# Patient Record
Sex: FEMALE | Race: WHITE | NOT HISPANIC OR LATINO | URBAN - METROPOLITAN AREA
[De-identification: names, ages, dates, MRNs, and addresses within clinical notes are randomized per-mention and may not be internally consistent; named-entity substitution may affect disease eponyms.]

---

## 2024-10-12 ENCOUNTER — TELEPHONE (OUTPATIENT)
Dept: OTHER | Facility: OTHER | Age: 19
End: 2024-10-12

## 2024-10-15 ENCOUNTER — OFFICE VISIT (OUTPATIENT)
Age: 19
End: 2024-10-15

## 2024-10-15 VITALS
TEMPERATURE: 98.4 F | HEIGHT: 63 IN | HEART RATE: 77 BPM | SYSTOLIC BLOOD PRESSURE: 132 MMHG | OXYGEN SATURATION: 97 % | WEIGHT: 151.6 LBS | DIASTOLIC BLOOD PRESSURE: 80 MMHG | BODY MASS INDEX: 26.86 KG/M2

## 2024-10-15 DIAGNOSIS — G89.29 CHRONIC ABDOMINAL PAIN: ICD-10-CM

## 2024-10-15 DIAGNOSIS — R11.0 CHRONIC NAUSEA: Primary | ICD-10-CM

## 2024-10-15 DIAGNOSIS — R10.9 CHRONIC ABDOMINAL PAIN: ICD-10-CM

## 2024-10-15 DIAGNOSIS — Z00.00 ROUTINE ADULT HEALTH MAINTENANCE: ICD-10-CM

## 2024-10-15 DIAGNOSIS — R68.81 EARLY SATIETY: ICD-10-CM

## 2024-10-15 DIAGNOSIS — F41.9 ANXIETY: ICD-10-CM

## 2024-10-15 DIAGNOSIS — F32.A DEPRESSION, UNSPECIFIED DEPRESSION TYPE: ICD-10-CM

## 2024-10-15 PROCEDURE — 99395 PREV VISIT EST AGE 18-39: CPT | Performed by: FAMILY MEDICINE

## 2024-10-15 PROCEDURE — 93000 ELECTROCARDIOGRAM COMPLETE: CPT | Performed by: FAMILY MEDICINE

## 2024-10-15 PROCEDURE — 99204 OFFICE O/P NEW MOD 45 MIN: CPT | Performed by: FAMILY MEDICINE

## 2024-10-15 RX ORDER — VENLAFAXINE HCL 37.5 MG
CAPSULE, EXT RELEASE 24 HR ORAL
COMMUNITY
Start: 2024-08-08

## 2024-10-15 RX ORDER — PANTOPRAZOLE SODIUM 40 MG/1
40 TABLET, DELAYED RELEASE ORAL DAILY
Qty: 60 TABLET | Refills: 1 | Status: SHIPPED | OUTPATIENT
Start: 2024-10-15

## 2024-10-15 RX ORDER — BIFIDOBACTERIUM LONGUM SUBSP. INFANTIS, AVOBENZONE, HOMOSALATE, OCTISALATE, OCTOCRYLENE, AND OXYBENZONE
KIT
COMMUNITY

## 2024-10-15 RX ORDER — ARIPIPRAZOLE 10 MG/1
TABLET ORAL
COMMUNITY
Start: 2024-05-07

## 2024-10-15 RX ORDER — PROPRANOLOL HYDROCHLORIDE 10 MG/1
TABLET ORAL
COMMUNITY
Start: 2024-05-07

## 2024-10-15 RX ORDER — ONDANSETRON 4 MG/1
4 TABLET, ORALLY DISINTEGRATING ORAL EVERY 8 HOURS PRN
Qty: 30 TABLET | Refills: 1 | Status: SHIPPED | OUTPATIENT
Start: 2024-10-15

## 2024-10-15 RX ORDER — LEVONORGESTREL AND ETHINYL ESTRADIOL 0.1-0.02MG
KIT ORAL
COMMUNITY

## 2024-10-15 RX ORDER — ETONOGESTREL 68 MG/1
IMPLANT SUBCUTANEOUS
COMMUNITY

## 2024-10-15 RX ORDER — PROMETHAZINE HYDROCHLORIDE 12.5 MG/1
TABLET ORAL
COMMUNITY

## 2024-10-17 PROBLEM — R10.9 CHRONIC ABDOMINAL PAIN: Status: ACTIVE | Noted: 2024-10-17

## 2024-10-17 PROBLEM — R68.81 EARLY SATIETY: Status: ACTIVE | Noted: 2024-10-17

## 2024-10-17 PROBLEM — G89.29 CHRONIC ABDOMINAL PAIN: Status: ACTIVE | Noted: 2024-10-17

## 2024-10-17 NOTE — PROGRESS NOTES
Assessment/Plan:    18 y/o woman with: chronic nausea, chronic abdominal pain with early satiety, with depression and anxiety along with annual well visit. Will give Zofran PRN, will begin trial of protonix and refer to GI, Discussed supportive care and return parameters.     Regarding Annual well visit. Discussed various safety and health maintenance issues including healthy diet like the Mediterranean diet, exercise, ample sleep, stress reduction, and healthy weight as tolerated. Discussed supportive care and return parameters.     No problem-specific Assessment & Plan notes found for this encounter.       Diagnoses and all orders for this visit:    Chronic nausea  -     ondansetron (ZOFRAN-ODT) 4 mg disintegrating tablet; Take 1 tablet (4 mg total) by mouth every 8 (eight) hours as needed for nausea or vomiting  -     pantoprazole (PROTONIX) 40 mg tablet; Take 1 tablet (40 mg total) by mouth daily  -     Ambulatory Referral to Gastroenterology; Future  -     POCT ECG    Chronic abdominal pain  -     pantoprazole (PROTONIX) 40 mg tablet; Take 1 tablet (40 mg total) by mouth daily  -     Ambulatory Referral to Gastroenterology; Future  -     POCT ECG    Early satiety  -     pantoprazole (PROTONIX) 40 mg tablet; Take 1 tablet (40 mg total) by mouth daily  -     Ambulatory Referral to Gastroenterology; Future  -     POCT ECG    Depression, unspecified depression type    Anxiety    Routine adult health maintenance    Other orders  -     ARIPiprazole (ABILIFY) 10 mg tablet  -     Bacillus Coagulans-Inulin (Align Prebiotic-Probiotic) 5-1.25 MG-GM CHEW; Chew  -     Cholecalciferol (VITAMIN D3) 1,000 units tablet; Take 1,000 Units by mouth daily  -     etonogestrel (Nexplanon) subdermal implant; as directed Subcutaneous evry 3 yrs (Patient not taking: Reported on 10/15/2024)  -     Aviane 0.1-20 MG-MCG per tablet; Take by mouth  -     promethazine (PHENERGAN) 12.5 MG tablet; Every 6 hours  -     propranolol (INDERAL) 10  "mg tablet  -     Effexor XR 37.5 MG 24 hr capsule; Take by mouth          Subjective:     Chief Complaint   Patient presents with    New Patient Visit     Establish care    GI Problem     Patient was in urgent care with food poisoning a few days ago and was advised to follow up with a family Dr. No further concerns, ng        Patient ID: Caro Andrews is a 19 y.o. female.    Patient is a 20 y/o woman who presents to establish care at this practice. Pt has chronic nausea, chronic abdominal pain with early satiety, with depression and anxiety. No fevers chills, tolerating PO intake. Pt also here for annual well visit admits being active, eats and sleeps well.    GI Problem  The primary symptoms include abdominal pain and nausea.       The following portions of the patient's history were reviewed and updated as appropriate: allergies, current medications, past family history, past medical history, past social history, past surgical history and problem list.    Review of Systems   Constitutional: Negative.    HENT: Negative.     Eyes: Negative.    Respiratory: Negative.     Cardiovascular: Negative.    Gastrointestinal:  Positive for abdominal pain and nausea.   Endocrine: Negative.    Genitourinary: Negative.    Musculoskeletal: Negative.    Allergic/Immunologic: Negative.    Neurological: Negative.    Hematological: Negative.    Psychiatric/Behavioral: Negative.     All other systems reviewed and are negative.        Objective:      /80 (BP Location: Right arm, Patient Position: Sitting, Cuff Size: Standard)   Pulse 77   Temp 98.4 °F (36.9 °C) (Temporal)   Ht 5' 2.75\" (1.594 m)   Wt 68.8 kg (151 lb 9.6 oz)   SpO2 97%   BMI 27.07 kg/m²          Physical Exam  Constitutional:       Appearance: She is well-developed.   HENT:      Head: Atraumatic.      Right Ear: External ear normal.      Left Ear: External ear normal.   Eyes:      Conjunctiva/sclera: Conjunctivae normal.      Pupils: Pupils are equal, " round, and reactive to light.   Cardiovascular:      Rate and Rhythm: Normal rate and regular rhythm.      Heart sounds: Normal heart sounds.   Pulmonary:      Effort: Pulmonary effort is normal. No respiratory distress.      Breath sounds: Normal breath sounds.   Abdominal:      General: There is no distension.      Palpations: Abdomen is soft.      Tenderness: There is no abdominal tenderness. There is no guarding or rebound.   Musculoskeletal:         General: Normal range of motion.      Cervical back: Normal range of motion.   Skin:     General: Skin is warm and dry.   Neurological:      Mental Status: She is alert and oriented to person, place, and time.      Cranial Nerves: No cranial nerve deficit.   Psychiatric:         Behavior: Behavior normal.         Thought Content: Thought content normal.         Judgment: Judgment normal.

## 2024-11-14 PROBLEM — Z00.00 ROUTINE ADULT HEALTH MAINTENANCE: Status: RESOLVED | Noted: 2024-10-15 | Resolved: 2024-11-14

## 2024-11-26 ENCOUNTER — OFFICE VISIT (OUTPATIENT)
Dept: GASTROENTEROLOGY | Facility: MEDICAL CENTER | Age: 19
End: 2024-11-26
Payer: OTHER GOVERNMENT

## 2024-11-26 ENCOUNTER — TELEPHONE (OUTPATIENT)
Dept: GASTROENTEROLOGY | Facility: MEDICAL CENTER | Age: 19
End: 2024-11-26

## 2024-11-26 VITALS
BODY MASS INDEX: 27.93 KG/M2 | WEIGHT: 156.4 LBS | SYSTOLIC BLOOD PRESSURE: 127 MMHG | TEMPERATURE: 96.5 F | HEART RATE: 76 BPM | DIASTOLIC BLOOD PRESSURE: 80 MMHG

## 2024-11-26 DIAGNOSIS — R68.81 EARLY SATIETY: ICD-10-CM

## 2024-11-26 DIAGNOSIS — G89.29 CHRONIC ABDOMINAL PAIN: ICD-10-CM

## 2024-11-26 DIAGNOSIS — R11.0 CHRONIC NAUSEA: ICD-10-CM

## 2024-11-26 DIAGNOSIS — R10.9 CHRONIC ABDOMINAL PAIN: ICD-10-CM

## 2024-11-26 PROCEDURE — 99204 OFFICE O/P NEW MOD 45 MIN: CPT | Performed by: NURSE PRACTITIONER

## 2024-11-26 RX ORDER — BUSPIRONE HYDROCHLORIDE 5 MG/1
5 TABLET ORAL 3 TIMES DAILY
COMMUNITY

## 2024-11-26 RX ORDER — PANTOPRAZOLE SODIUM 40 MG/1
40 TABLET, DELAYED RELEASE ORAL 2 TIMES DAILY
Qty: 60 TABLET | Refills: 3 | Status: SHIPPED | OUTPATIENT
Start: 2024-11-26 | End: 2024-12-02

## 2024-11-26 RX ORDER — SODIUM CHLORIDE, SODIUM LACTATE, POTASSIUM CHLORIDE, CALCIUM CHLORIDE 600; 310; 30; 20 MG/100ML; MG/100ML; MG/100ML; MG/100ML
125 INJECTION, SOLUTION INTRAVENOUS CONTINUOUS
Status: CANCELLED | OUTPATIENT
Start: 2024-11-26

## 2024-11-26 NOTE — ASSESSMENT & PLAN NOTE
Refer above  Orders:    Ambulatory Referral to Gastroenterology    EGD; Future    pantoprazole (PROTONIX) 40 mg tablet; Take 1 tablet (40 mg total) by mouth 2 (two) times a day

## 2024-11-26 NOTE — PROGRESS NOTES
Name: Caro Andrews      : 2005      MRN: [id number]  Encounter Provider: BLESSING Reid  Encounter Date: 2024   Encounter department: [facility]  :  Assessment & Plan  Chronic nausea    Orders:    Ambulatory Referral to Gastroenterology    Chronic abdominal pain    Orders:    Ambulatory Referral to Gastroenterology    Early satiety    Orders:    Ambulatory Referral to Gastroenterology        History of Present Illness {?Quick Links Encounters * My Last Note * Last Note in Specialty * Snapshot * Since Last Visit * History :54665}    HPI  Caro Andrews is a 19 y.o. female who presents ***  {History obtained from(Optional):03969}    Review of Systems  Past Medical History   Past Medical History:   Diagnosis Date    Anxiety     Depression      Past Surgical History:   Procedure Laterality Date    APPENDECTOMY  2019    STOMACH SURGERY  2012    STOMACH SURGERY  2018     Family History   Problem Relation Age of Onset    Rheum arthritis Mother       reports that she has been smoking cigarettes. She has never used smokeless tobacco. She reports current alcohol use. She reports that she does not use drugs.  Current Outpatient Medications on File Prior to Visit   Medication Sig Dispense Refill    ARIPiprazole (ABILIFY) 10 mg tablet       Aviane 0.1-20 MG-MCG per tablet Take by mouth      Bacillus Coagulans-Inulin (Align Prebiotic-Probiotic) 5-1.25 MG-GM CHEW Chew      Cholecalciferol (VITAMIN D3) 1,000 units tablet Take 1,000 Units by mouth daily      Effexor XR 37.5 MG 24 hr capsule Take by mouth      etonogestrel (Nexplanon) subdermal implant as directed Subcutaneous evry 3 yrs (Patient not taking: Reported on 10/15/2024)      ondansetron (ZOFRAN-ODT) 4 mg disintegrating tablet Take 1 tablet (4 mg total) by mouth every 8 (eight) hours as needed for nausea or vomiting 30 tablet 1    pantoprazole (PROTONIX) 40 mg tablet Take 1 tablet (40 mg total)  by mouth daily 60 tablet 1    promethazine (PHENERGAN) 12.5 MG tablet Every 6 hours      propranolol (INDERAL) 10 mg tablet        No current facility-administered medications on file prior to visit.     Allergies   Allergen Reactions    Kiwi Extract - Food Allergy Anaphylaxis, Angioedema, Other (See Comments) and Tongue Swelling     Other reaction(s): Other (See Comments)    Acetaminophen GI Bleeding     Other reaction(s): GI bleeding    Acetaminophen-Codeine Other (See Comments)     nausea and feeling poorly    Chlorhexidine Other (See Comments)     Other reaction(s): Swelling    Strawberry Extract - Food Allergy Other (See Comments)     Other reaction(s): Swelling/Tingling    Other reaction(s): Swelling/Tingling   Other reaction(s): Swelling/Tingling      {Select to insert medical history sections (Optional):80425}     Objective {?Quick Links Trend Vitals * Enter New Vitals * Results Review * Timeline (Adult) * Labs * Imaging * Cardiology * Procedures * Lung Cancer Screening * Surgical eConsent :15609}  There were no vitals taken for this visit.     Physical Exam    {Administrative / Billing Section (Optional):86971}

## 2024-11-26 NOTE — ASSESSMENT & PLAN NOTE
Started approximately 3 years ago with chronic intermittent nausea.  She is okay when she awakens in the morning but shortly after that the nausea is persistent throughout the day.  Only occasional vomiting.  Usually vomits right after she eats.  Had a gastric emptying study in the past which was normal and a HIDA scan with CCK which noted an ejection fraction of 44%.  She did have an EGD several years ago which she is not sure of the results.  No weight loss.  Rare caffeine use.  Rare NSAID use and no alcohol use.  No weight loss.  Will rule out celiac, H. pylori, PUD, gastritis/esophagitis.  Will repeat HIDA scan with CCK as ejection fraction was 44% several years ago.  She is not sure if there is gallbladder disease in the family.  No marijuana use.    Orders:    Ambulatory Referral to Gastroenterology    EGD; Future    Tissue Transglutaminase, IgA; Future    IgA; Future    NM hepatobiliary w rx; Future    pantoprazole (PROTONIX) 40 mg tablet; Take 1 tablet (40 mg total) by mouth 2 (two) times a day    CBC (Includes Diff/Plt) (Refl); Future    Comprehensive metabolic panel; Future    Lipase; Future    TSH, 3rd generation; Future  -Antireflux diet

## 2024-11-26 NOTE — ASSESSMENT & PLAN NOTE
Refer above  Orders:    Ambulatory Referral to Gastroenterology    EGD; Future    Tissue Transglutaminase, IgA; Future    IgA; Future    NM hepatobiliary w rx; Future    pantoprazole (PROTONIX) 40 mg tablet; Take 1 tablet (40 mg total) by mouth 2 (two) times a day    CBC (Includes Diff/Plt) (Refl); Future    Comprehensive metabolic panel; Future    Lipase; Future    TSH, 3rd generation; Future

## 2024-11-26 NOTE — PROGRESS NOTES
Name: Caro Andrews      : 2005      MRN: [id number]  Encounter Provider: BLESSING Reid  Encounter Date: 2024   Encounter department: [facility]  :  Assessment & Plan  Chronic nausea  Started approximately 3 years ago with chronic intermittent nausea.  She is okay when she awakens in the morning but shortly after that the nausea is persistent throughout the day.  Only occasional vomiting.  Usually vomits right after she eats.  Had a gastric emptying study in the past which was normal and a HIDA scan with CCK which noted an ejection fraction of 44%.  She did have an EGD several years ago which she is not sure of the results.  No weight loss.  Rare caffeine use.  Rare NSAID use and no alcohol use.  No weight loss.  Will rule out celiac, H. pylori, PUD, gastritis/esophagitis.  Will repeat HIDA scan with CCK as ejection fraction was 44% several years ago.  She is not sure if there is gallbladder disease in the family.  No marijuana use.    Orders:    Ambulatory Referral to Gastroenterology    EGD; Future    Tissue Transglutaminase, IgA; Future    IgA; Future    NM hepatobiliary w rx; Future    pantoprazole (PROTONIX) 40 mg tablet; Take 1 tablet (40 mg total) by mouth 2 (two) times a day    CBC (Includes Diff/Plt) (Refl); Future    Comprehensive metabolic panel; Future    Lipase; Future    TSH, 3rd generation; Future  -Antireflux diet  Chronic abdominal pain  Refer above  Orders:    Ambulatory Referral to Gastroenterology    EGD; Future    Tissue Transglutaminase, IgA; Future    IgA; Future    NM hepatobiliary w rx; Future    pantoprazole (PROTONIX) 40 mg tablet; Take 1 tablet (40 mg total) by mouth 2 (two) times a day    CBC (Includes Diff/Plt) (Refl); Future    Comprehensive metabolic panel; Future    Lipase; Future    TSH, 3rd generation; Future    Early satiety  Refer above  Orders:    Ambulatory Referral to Gastroenterology    EGD; Future     pantoprazole (PROTONIX) 40 mg tablet; Take 1 tablet (40 mg total) by mouth 2 (two) times a day    I reviewed with patient/family potential risks of endoscopic evaluation including possible infection, bleeding or perforation.  Patient/family verbalized understanding of potential risks and agreed to procedure(s).    History of Present Illness     HPI  Caro Andrews is a 19 y.o. female who presents with chronic nausea and abdominal pain.  She has past medical history of depression and anxiety, ovarian torsion with partial removal of ovary endometriosis.  She has had 3 abdominal surgeries for endometriosis and her ovarian torsion.  Also had her appendix out.    Started approximately 3 years ago with chronic intermittent nausea.  Reports she is okay when she wakes up in the morning but within 5 minutes she develops nausea.  Only occasional vomiting if she overeats.  Cannot pinpoint triggers of nausea.  Has been on PPIs in the past with no help.  She did have a gastric emptying study in 2021 which was normal.  She did have a HIDA scan with CCK in 12/20 which noted an ejection fraction of 44%.  Ultrasound of the abdomen 6/23 was unremarkable.  No weight loss.  She does get some early satiety.  Ondansetron does help her nausea.  Also reporting intermittent abdominal cramping that can be better with a BM.  BMs can be 1-2 times per day.  Occasionally it is soft.  No melena or hematochezia.  Reports she had an EGD many years ago but not sure of the results.  She drinks caffeine twice a week.  Occasional NSAID use.  No alcohol use.  No recent labs to review.  No new meds or change of medication since this all started.  Her surgeries for her abdomen were 2015 2016 and 2018.        Ultrasound the abdomen 6/23-unremarkable.  HIDA scan with CCK 12/20-normal exam with ejection fraction of 44%.  Gastric emptying study 1/21 normal    No recent labs to review.    Previous EGD/colonoscopy        History obtained from:  patient    Review of Systems   Gastrointestinal:  Positive for abdominal pain, nausea and vomiting.   All other systems reviewed and are negative.    Past Medical History   Past Medical History:   Diagnosis Date    Anxiety     Depression      Past Surgical History:   Procedure Laterality Date    APPENDECTOMY  2019    STOMACH SURGERY  2012    STOMACH SURGERY  2018     Family History   Problem Relation Age of Onset    Rheum arthritis Mother       reports that she has been smoking cigarettes. She has never used smokeless tobacco. She reports current alcohol use. She reports that she does not use drugs.  Current Outpatient Medications on File Prior to Visit   Medication Sig Dispense Refill    ARIPiprazole (ABILIFY) 10 mg tablet       Aviane 0.1-20 MG-MCG per tablet Take by mouth      Bacillus Coagulans-Inulin (Align Prebiotic-Probiotic) 5-1.25 MG-GM CHEW Chew      Cholecalciferol (VITAMIN D3) 1,000 units tablet Take 1,000 Units by mouth daily      Effexor XR 37.5 MG 24 hr capsule Take by mouth      etonogestrel (Nexplanon) subdermal implant as directed Subcutaneous evry 3 yrs (Patient not taking: Reported on 10/15/2024)      ondansetron (ZOFRAN-ODT) 4 mg disintegrating tablet Take 1 tablet (4 mg total) by mouth every 8 (eight) hours as needed for nausea or vomiting 30 tablet 1    pantoprazole (PROTONIX) 40 mg tablet Take 1 tablet (40 mg total) by mouth daily 60 tablet 1    promethazine (PHENERGAN) 12.5 MG tablet Every 6 hours      propranolol (INDERAL) 10 mg tablet        No current facility-administered medications on file prior to visit.     Allergies   Allergen Reactions    Kiwi Extract - Food Allergy Anaphylaxis, Angioedema, Other (See Comments) and Tongue Swelling     Other reaction(s): Other (See Comments)    Acetaminophen GI Bleeding     Other reaction(s): GI bleeding    Acetaminophen-Codeine Other (See Comments)     nausea and feeling poorly    Chlorhexidine Other (See Comments)     Other reaction(s): Swelling     Strawberry Extract - Food Allergy Other (See Comments)     Other reaction(s): Swelling/Tingling    Other reaction(s): Swelling/Tingling   Other reaction(s): Swelling/Tingling      Medical History Reviewed by provider this encounter:  Tobacco  Allergies  Meds  Problems  Med Hx  Surg Hx  Fam Hx     .  Current Outpatient Medications on File Prior to Visit   Medication Sig Dispense Refill    ARIPiprazole (ABILIFY) 10 mg tablet       Aviane 0.1-20 MG-MCG per tablet Take by mouth      Bacillus Coagulans-Inulin (Align Prebiotic-Probiotic) 5-1.25 MG-GM CHEW Chew      Cholecalciferol (VITAMIN D3) 1,000 units tablet Take 1,000 Units by mouth daily      Effexor XR 37.5 MG 24 hr capsule Take by mouth      etonogestrel (Nexplanon) subdermal implant as directed Subcutaneous evry 3 yrs (Patient not taking: Reported on 10/15/2024)      ondansetron (ZOFRAN-ODT) 4 mg disintegrating tablet Take 1 tablet (4 mg total) by mouth every 8 (eight) hours as needed for nausea or vomiting 30 tablet 1    pantoprazole (PROTONIX) 40 mg tablet Take 1 tablet (40 mg total) by mouth daily 60 tablet 1    promethazine (PHENERGAN) 12.5 MG tablet Every 6 hours      propranolol (INDERAL) 10 mg tablet        No current facility-administered medications on file prior to visit.      Social History     Tobacco Use    Smoking status: Every Day     Types: Cigarettes    Smokeless tobacco: Never   Vaping Use    Vaping status: Never Used   Substance and Sexual Activity    Alcohol use: Yes     Comment: occasional    Drug use: Never    Sexual activity: Yes        Objective   There were no vitals taken for this visit.     Physical Exam  Constitutional:       Appearance: She is well-developed.   Cardiovascular:      Rate and Rhythm: Normal rate and regular rhythm.   Pulmonary:      Effort: Pulmonary effort is normal.      Breath sounds: Normal breath sounds.   Abdominal:      General: Bowel sounds are normal.      Palpations: Abdomen is soft.      Tenderness:  There is abdominal tenderness in the epigastric area.   Neurological:      Mental Status: She is alert and oriented to person, place, and time.

## 2024-11-26 NOTE — TELEPHONE ENCOUNTER
Scheduled date of EGD (as of today) December 2  Physician performing: Dr. Chisholm  Location of procedure:  [facility]  Instructions given to patient: EGD  Clearances: None  Diabetic: No

## 2024-12-02 ENCOUNTER — ANESTHESIA EVENT (OUTPATIENT)
Dept: GASTROENTEROLOGY | Facility: MEDICAL CENTER | Age: 19
End: 2024-12-02
Payer: OTHER GOVERNMENT

## 2024-12-02 ENCOUNTER — HOSPITAL ENCOUNTER (OUTPATIENT)
Dept: GASTROENTEROLOGY | Facility: MEDICAL CENTER | Age: 19
Setting detail: OUTPATIENT SURGERY
Discharge: HOME | End: 2024-12-02
Payer: OTHER GOVERNMENT

## 2024-12-02 VITALS
OXYGEN SATURATION: 100 % | HEIGHT: 63 IN | HEART RATE: 78 BPM | TEMPERATURE: 97.4 F | WEIGHT: 150 LBS | DIASTOLIC BLOOD PRESSURE: 67 MMHG | BODY MASS INDEX: 26.58 KG/M2 | RESPIRATION RATE: 16 BRPM | SYSTOLIC BLOOD PRESSURE: 124 MMHG

## 2024-12-02 DIAGNOSIS — R10.9 CHRONIC ABDOMINAL PAIN: ICD-10-CM

## 2024-12-02 DIAGNOSIS — R11.0 CHRONIC NAUSEA: ICD-10-CM

## 2024-12-02 DIAGNOSIS — R68.81 EARLY SATIETY: ICD-10-CM

## 2024-12-02 DIAGNOSIS — G89.29 CHRONIC ABDOMINAL PAIN: ICD-10-CM

## 2024-12-02 PROCEDURE — 88305 TISSUE EXAM BY PATHOLOGIST: CPT | Performed by: PATHOLOGY

## 2024-12-02 PROCEDURE — 43239 EGD BIOPSY SINGLE/MULTIPLE: CPT | Performed by: INTERNAL MEDICINE

## 2024-12-02 RX ORDER — PANTOPRAZOLE SODIUM 40 MG/1
40 TABLET, DELAYED RELEASE ORAL DAILY
Qty: 30 TABLET | Refills: 5 | Status: SHIPPED | OUTPATIENT
Start: 2024-12-02 | End: 2025-05-31

## 2024-12-02 RX ORDER — PROPOFOL 10 MG/ML
INJECTION, EMULSION INTRAVENOUS AS NEEDED
Status: DISCONTINUED | OUTPATIENT
Start: 2024-12-02 | End: 2024-12-02

## 2024-12-02 RX ORDER — FENTANYL CITRATE 50 UG/ML
INJECTION, SOLUTION INTRAMUSCULAR; INTRAVENOUS AS NEEDED
Status: DISCONTINUED | OUTPATIENT
Start: 2024-12-02 | End: 2024-12-02

## 2024-12-02 RX ORDER — SODIUM CHLORIDE 9 MG/ML
INJECTION, SOLUTION INTRAVENOUS CONTINUOUS PRN
Status: DISCONTINUED | OUTPATIENT
Start: 2024-12-02 | End: 2024-12-02

## 2024-12-02 RX ORDER — OMEPRAZOLE 40 MG/1
40 CAPSULE, DELAYED RELEASE ORAL DAILY
Qty: 30 CAPSULE | Refills: 5 | Status: SHIPPED | OUTPATIENT
Start: 2024-12-02 | End: 2024-12-02 | Stop reason: HOSPADM

## 2024-12-02 RX ORDER — LIDOCAINE HYDROCHLORIDE 20 MG/ML
INJECTION, SOLUTION EPIDURAL; INFILTRATION; INTRACAUDAL; PERINEURAL AS NEEDED
Status: DISCONTINUED | OUTPATIENT
Start: 2024-12-02 | End: 2024-12-02

## 2024-12-02 RX ORDER — SODIUM CHLORIDE 9 MG/ML
125 INJECTION, SOLUTION INTRAVENOUS CONTINUOUS
Status: CANCELLED | OUTPATIENT
Start: 2024-12-02

## 2024-12-02 RX ORDER — ONDANSETRON 2 MG/ML
4 INJECTION INTRAMUSCULAR; INTRAVENOUS ONCE AS NEEDED
Status: DISCONTINUED | OUTPATIENT
Start: 2024-12-02 | End: 2024-12-06 | Stop reason: HOSPADM

## 2024-12-02 RX ADMIN — LIDOCAINE HYDROCHLORIDE 100 MG: 20 INJECTION, SOLUTION EPIDURAL; INFILTRATION; INTRACAUDAL at 08:54

## 2024-12-02 RX ADMIN — PROPOFOL 50 MG: 10 INJECTION, EMULSION INTRAVENOUS at 08:56

## 2024-12-02 RX ADMIN — PROPOFOL 150 MG: 10 INJECTION, EMULSION INTRAVENOUS at 08:54

## 2024-12-02 RX ADMIN — PROPOFOL 50 MG: 10 INJECTION, EMULSION INTRAVENOUS at 08:58

## 2024-12-02 RX ADMIN — FENTANYL CITRATE 50 MCG: 50 INJECTION INTRAMUSCULAR; INTRAVENOUS at 08:53

## 2024-12-02 RX ADMIN — PROPOFOL 50 MG: 10 INJECTION, EMULSION INTRAVENOUS at 09:00

## 2024-12-02 RX ADMIN — SODIUM CHLORIDE: 0.9 INJECTION, SOLUTION INTRAVENOUS at 08:54

## 2024-12-02 NOTE — ANESTHESIA POSTPROCEDURE EVALUATION
Post-Op Assessment Note    CV Status:  Stable  Pain Score: 0    Pain management: adequate       Mental Status:  Alert and awake   Hydration Status:  Euvolemic   PONV Controlled:  Controlled   Airway Patency:  Patent     Post Op Vitals Reviewed: Yes    No anethesia notable event occurred.    Staff: CRNA           Last Filed PACU Vitals:  Vitals Value Taken Time   Temp     Pulse 66 12/02/24 0907   BP 94/44 12/02/24 0907   Resp 16 12/02/24 0907   SpO2 97 % 12/02/24 0907       Modified Julio:  Activity: 2 (12/2/2024  8:25 AM)  Respiration: 2 (12/2/2024  8:25 AM)  Circulation: 2 (12/2/2024  8:25 AM)  Consciousness: 2 (12/2/2024  8:25 AM)  Oxygen Saturation: 2 (12/2/2024  8:25 AM)  Modified Julio Score: 10 (12/2/2024  8:25 AM)

## 2024-12-02 NOTE — H&P
"History and Physical -  [facility]  Caro Andrews 19 y.o. female MRN: [id number]                  HPI: Caro Andrews is a 19 y.o. year old female who presents for EGD to investigate nausea, abdominal pain and early satiety.      REVIEW OF SYSTEMS: Per the HPI, and otherwise unremarkable.    Historical Information   Past Medical History:   Diagnosis Date    Anxiety     Chronic nausea     Depression      Past Surgical History:   Procedure Laterality Date    APPENDECTOMY  2019    STOMACH SURGERY  2012    STOMACH SURGERY  2018    ovarian torsion     Social History   Social History     Substance and Sexual Activity   Alcohol Use Yes    Comment: occasional     Social History     Substance and Sexual Activity   Drug Use Never    Comment: medical marijuana     Social History     Tobacco Use   Smoking Status Never   Smokeless Tobacco Never     Family History   Problem Relation Age of Onset    Rheum arthritis Mother        Meds/Allergies     Not in a hospital admission.    Allergies   Allergen Reactions    Kiwi Extract - Food Allergy Anaphylaxis, Angioedema, Other (See Comments) and Tongue Swelling     Other reaction(s): Other (See Comments)    Acetaminophen GI Bleeding     Other reaction(s): GI bleeding    Acetaminophen-Codeine Other (See Comments)     nausea and feeling poorly    Chlorhexidine Other (See Comments)     Other reaction(s): Swelling    Strawberry Extract - Food Allergy Other (See Comments)     Other reaction(s): Swelling/Tingling    Other reaction(s): Swelling/Tingling   Other reaction(s): Swelling/Tingling       Objective     Blood pressure 133/81, pulse 79, temperature (!) 97.4 °F (36.3 °C), temperature source Temporal, resp. rate 20, height 5' 3\" (1.6 m), weight 68 kg (150 lb), SpO2 96%.      PHYSICAL EXAM    Gen: NAD  CV: RRR  CHEST: Clear  ABD: soft, NT/ND  EXT: no edema      ASSESSMENT/PLAN:  Caro Andrews is a 19 y.o. year old female who presents for EGD to investigate " nausea, abdominal pain and early satiety. The patient is stable and optimized for the procedure, we reviewed risk and benefits. Risk include but not limited to infection, bleeding, perforation and missing a lesion.

## 2024-12-02 NOTE — ANESTHESIA PREPROCEDURE EVALUATION
Procedure:  EGD    Relevant Problems   NEURO/PSYCH   (+) Anxiety   (+) Chronic abdominal pain   (+) Depression        Physical Exam    Airway    Mallampati score: II  TM Distance: >3 FB  Neck ROM: full     Dental   No notable dental hx     Cardiovascular  Rhythm: regular, Rate: normal    Pulmonary   No stridor    Other Findings  post-pubertal.      Anesthesia Plan  ASA Score- 2     Anesthesia Type- IV sedation with anesthesia with ASA Monitors.         Additional Monitors:     Airway Plan:            Plan Factors-    Chart reviewed.   Existing labs reviewed. Patient summary reviewed.                  Induction- intravenous.    Postoperative Plan- Plan for postoperative opioid use.     Perioperative Resuscitation Plan - Level 1 - Full Code.       Informed Consent- Anesthetic plan and risks discussed with patient.  I personally reviewed this patient with the CRNA. Discussed and agreed on the Anesthesia Plan with the CRNA..

## 2024-12-04 ENCOUNTER — HOSPITAL ENCOUNTER (OUTPATIENT)
Dept: NUCLEAR MEDICINE | Facility: HOSPITAL | Age: 19
Discharge: HOME | End: 2024-12-04
Payer: OTHER GOVERNMENT

## 2024-12-04 ENCOUNTER — RESULTS FOLLOW-UP (OUTPATIENT)
Dept: GASTROENTEROLOGY | Facility: CLINIC | Age: 19
End: 2024-12-04

## 2024-12-04 ENCOUNTER — RESULTS FOLLOW-UP (OUTPATIENT)
Dept: GASTROENTEROLOGY | Facility: MEDICAL CENTER | Age: 19
End: 2024-12-04

## 2024-12-04 ENCOUNTER — APPOINTMENT (OUTPATIENT)
Dept: LAB | Facility: MEDICAL CENTER | Age: 19
End: 2024-12-04
Payer: OTHER GOVERNMENT

## 2024-12-04 DIAGNOSIS — G89.29 CHRONIC ABDOMINAL PAIN: ICD-10-CM

## 2024-12-04 DIAGNOSIS — R11.0 CHRONIC NAUSEA: ICD-10-CM

## 2024-12-04 DIAGNOSIS — R11.0 CHRONIC NAUSEA: Primary | ICD-10-CM

## 2024-12-04 DIAGNOSIS — R10.9 CHRONIC ABDOMINAL PAIN: ICD-10-CM

## 2024-12-04 LAB
ALBUMIN SERPL BCG-MCNC: 4.3 G/DL (ref 3.5–5)
ALP SERPL-CCNC: 20 U/L (ref 34–104)
ALT SERPL W P-5'-P-CCNC: 22 U/L (ref 7–52)
ANION GAP SERPL CALCULATED.3IONS-SCNC: 10 MMOL/L (ref 4–13)
AST SERPL W P-5'-P-CCNC: 28 U/L (ref 13–39)
BASOPHILS # BLD AUTO: 0.04 THOUSANDS/ÂΜL (ref 0–0.1)
BASOPHILS NFR BLD AUTO: 1 % (ref 0–1)
BILIRUB SERPL-MCNC: 0.54 MG/DL (ref 0.2–1)
BUN SERPL-MCNC: 18 MG/DL (ref 5–25)
CALCIUM SERPL-MCNC: 9.5 MG/DL (ref 8.4–10.2)
CHLORIDE SERPL-SCNC: 103 MMOL/L (ref 96–108)
CO2 SERPL-SCNC: 25 MMOL/L (ref 21–32)
CREAT SERPL-MCNC: 0.9 MG/DL (ref 0.6–1.3)
EOSINOPHIL # BLD AUTO: 0.09 THOUSAND/ÂΜL (ref 0–0.61)
EOSINOPHIL NFR BLD AUTO: 1 % (ref 0–6)
ERYTHROCYTE [DISTWIDTH] IN BLOOD BY AUTOMATED COUNT: 12.7 % (ref 11.6–15.1)
GFR SERPL CREATININE-BSD FRML MDRD: 91 ML/MIN/1.73SQ M
GLUCOSE P FAST SERPL-MCNC: 98 MG/DL (ref 65–99)
HCT VFR BLD AUTO: 43.1 % (ref 34.8–46.1)
HGB BLD-MCNC: 14.3 G/DL (ref 11.5–15.4)
IGA SERPL-MCNC: 100 MG/DL (ref 66–433)
IMM GRANULOCYTES # BLD AUTO: 0.04 THOUSAND/UL (ref 0–0.2)
IMM GRANULOCYTES NFR BLD AUTO: 1 % (ref 0–2)
LIPASE SERPL-CCNC: 32 U/L (ref 11–82)
LYMPHOCYTES # BLD AUTO: 1.63 THOUSANDS/ÂΜL (ref 0.6–4.47)
LYMPHOCYTES NFR BLD AUTO: 19 % (ref 14–44)
MCH RBC QN AUTO: 29.9 PG (ref 26.8–34.3)
MCHC RBC AUTO-ENTMCNC: 33.2 G/DL (ref 31.4–37.4)
MCV RBC AUTO: 90 FL (ref 82–98)
MONOCYTES # BLD AUTO: 0.54 THOUSAND/ÂΜL (ref 0.17–1.22)
MONOCYTES NFR BLD AUTO: 6 % (ref 4–12)
NEUTROPHILS # BLD AUTO: 6.46 THOUSANDS/ÂΜL (ref 1.85–7.62)
NEUTS SEG NFR BLD AUTO: 72 % (ref 43–75)
NRBC BLD AUTO-RTO: 0 /100 WBCS
PLATELET # BLD AUTO: 272 THOUSANDS/UL (ref 149–390)
PMV BLD AUTO: 10 FL (ref 8.9–12.7)
POTASSIUM SERPL-SCNC: 4.3 MMOL/L (ref 3.5–5.3)
PROT SERPL-MCNC: 7.5 G/DL (ref 6.4–8.4)
RBC # BLD AUTO: 4.79 MILLION/UL (ref 3.81–5.12)
SODIUM SERPL-SCNC: 138 MMOL/L (ref 135–147)
TSH SERPL DL<=0.05 MIU/L-ACNC: 1.43 UIU/ML (ref 0.45–4.5)
WBC # BLD AUTO: 8.8 THOUSAND/UL (ref 4.31–10.16)

## 2024-12-04 PROCEDURE — A9537 TC99M MEBROFENIN: HCPCS

## 2024-12-04 PROCEDURE — 88305 TISSUE EXAM BY PATHOLOGIST: CPT | Performed by: PATHOLOGY

## 2024-12-04 PROCEDURE — 36415 COLL VENOUS BLD VENIPUNCTURE: CPT

## 2024-12-04 PROCEDURE — 84443 ASSAY THYROID STIM HORMONE: CPT

## 2024-12-04 PROCEDURE — 86364 TISS TRNSGLTMNASE EA IG CLAS: CPT

## 2024-12-04 PROCEDURE — 80053 COMPREHEN METABOLIC PANEL: CPT

## 2024-12-04 PROCEDURE — 82784 ASSAY IGA/IGD/IGG/IGM EACH: CPT

## 2024-12-04 PROCEDURE — 83690 ASSAY OF LIPASE: CPT

## 2024-12-04 PROCEDURE — 78227 HEPATOBIL SYST IMAGE W/DRUG: CPT

## 2024-12-04 PROCEDURE — 85025 COMPLETE CBC W/AUTO DIFF WBC: CPT

## 2024-12-04 RX ORDER — SINCALIDE 5 UG/5ML
0.02 INJECTION, POWDER, LYOPHILIZED, FOR SOLUTION INTRAVENOUS
Status: COMPLETED | OUTPATIENT
Start: 2024-12-04 | End: 2024-12-04

## 2024-12-04 RX ADMIN — SINCALIDE 1.4 MCG: 5 INJECTION, POWDER, LYOPHILIZED, FOR SOLUTION INTRAVENOUS at 13:35

## 2024-12-05 LAB
TTG IGA SER-ACNC: <0.5 U/ML
TTG IGA SER-ACNC: NEGATIVE

## 2024-12-07 ENCOUNTER — TELEPHONE (OUTPATIENT)
Dept: OTHER | Facility: OTHER | Age: 19
End: 2024-12-07

## 2024-12-07 NOTE — TELEPHONE ENCOUNTER
Pt would like call back to see if there are any openings that she can schedule an appt. Please call once office reopens

## 2024-12-09 ENCOUNTER — TELEPHONE (OUTPATIENT)
Age: 19
End: 2024-12-09

## 2024-12-09 NOTE — TELEPHONE ENCOUNTER
Writer received [employer] regarding Pt interested in scheduling an appt.  Writer contacted Pt  in an attempt to verify services needed/interested, and advise of current wait list. Pt stated that is interested in [facility] services. Pt added to [facility].

## 2024-12-26 DIAGNOSIS — R11.0 CHRONIC NAUSEA: ICD-10-CM

## 2024-12-26 DIAGNOSIS — R10.9 CHRONIC ABDOMINAL PAIN: ICD-10-CM

## 2024-12-26 DIAGNOSIS — R68.81 EARLY SATIETY: ICD-10-CM

## 2024-12-26 DIAGNOSIS — G89.29 CHRONIC ABDOMINAL PAIN: ICD-10-CM

## 2024-12-26 RX ORDER — PANTOPRAZOLE SODIUM 40 MG/1
40 TABLET, DELAYED RELEASE ORAL DAILY
Qty: 90 TABLET | Refills: 1 | Status: SHIPPED | OUTPATIENT
Start: 2024-12-26

## 2025-01-22 ENCOUNTER — TELEPHONE (OUTPATIENT)
Age: 20
End: 2025-01-22

## 2025-01-27 ENCOUNTER — TELEPHONE (OUTPATIENT)
Age: 20
End: 2025-01-27

## 2025-01-27 NOTE — TELEPHONE ENCOUNTER
"Behavioral Health Outpatient Intake Questions    Referred By   : Self Referral    Please advise interviewee that they need to answer all questions truthfully to allow for best care, and any misrepresentations of information may affect their ability to be seen at this clinic   => Was this discussed? Yes     If Minor Child (under age 18)    Who is/are the legal guardian(s) of the child?     Is there a custody agreement?      If \"YES\"- Custody orders must be obtained prior to scheduling the first appointment  In addition, Consent to Treatment must be signed by all legal guardians prior to scheduling the first appointment    If \"NO\"- Consent to Treatment must be signed by all legal guardians prior to scheduling the first appointment    Behavioral Health Outpatient Intake History -     Presenting Problem (in patient's own words): Second opinion regarding her previous psychiatrist diagnosis. Feels the doctor is not taking her serious.     Are there any communication barriers for this patient?     No                                               If yes, please describe barriers:   If there is a unique situation, please refer to Alexis Silvestre/Serina Buckley for final determination.    Are you taking any psychiatric medications? Yes     If \"YES\" -What are they Abilify, Rose     If \"YES\" -Who prescribes? Dr. Fuentes at [facility]    Has the Patient previously received outpatient Talk Therapy or Medication Management from [facility]  No        If \"YES\"- When, Where and with Whom?         If \"NO\" -Has Patient received these services elsewhere?       If \"YES\" -When, Where, and with Whom? [facility]    Has the Patient abused alcohol or other substances in the last 6 months ? No       If \"YES\" -What substance, How much, How often?     If illegal substance: Refer to [facility] (for KATARZYNA) or [facility].   If Alcohol in excess of 10 drinks per week:  Refer to [facility] (for KATARZYNA) or [facility]    Legal History-     Is this " "treatment court ordered? No   If \"yes \"send to :  Talk Therapy : Send to Alexis Silvestre for final determination   Med Management: Send to Dr. Bocanegra for final determination     Has the Patient been convicted of a felony? NO    If \"Yes\" send to -When, What?  Talk Therapy: Send to Alexis Silvestre for final determination   Med Management: Send to Dr. Bocanegra for final determination     ACCEPTED as a patient Yes  If \"Yes\" Appointment Date: 4/29/25 at 10am Dr. Gibbs    Referred Elsewhere? No  If “Yes” - (Where? Ex: [facility], [facility], [facility], [facility], etc.)         Name of Insurance Co: [employer]  Insurance ID# [id number]   Insurance Phone #   If ins is primary or secondary?primary   If patient is a minor, parents information such as Name, D.O.B of guarantor.  NP forms sent via [email or web address]  "

## 2025-02-03 ENCOUNTER — TELEPHONE (OUTPATIENT)
Dept: PSYCHIATRY | Facility: CLINIC | Age: 20
End: 2025-02-03

## 2025-02-03 NOTE — TELEPHONE ENCOUNTER
Forms sent via [employer].    [facility] requesting that forms be signed via [employer] prior to appt.